# Patient Record
(demographics unavailable — no encounter records)

---

## 2024-10-18 NOTE — HISTORY OF PRESENT ILLNESS
[FreeTextEntry1] : 55 yo deaf M presents with history of balanitis since July Had similar issues about 7 yrs ago and attributed it to a sensitivity to his condom Has also been having LUTS - weak flow, incomplete bladder emptying Also with nocturia which seems to be exacerbated by ejaculation Voiding just a few times per day, nocturia 2-3/night no dysuria, no gross hematuria Drinks 1L of water, sometimes coffee Recent labwork done by PCP also shows elevated PSA  9/15/21 Interval history: Doing well since last visit When he takes tamsulosin, LUTS is well controlled Notices that when he forgets the tamsulosin, huge difference in LUTS Balanitis resolved with previously prescribed steroid cream Recent PSA 5.52 Prostate MRi done after last visit showed no suspicious lesions but showed a very enlarged prostate (almost 100gms)  3/16/22 Interval history: LUTS under good control Some nocturia - 1-2/night No recurrence of his balanitis Here today for repeat PSA  9/16/22 Interval history: hasn't been taking tamsulosin consistently Intermittent LUTS No other complaints  3/17/23 Interval history: had some penile irritation when he got some hand  on it stable LUTS deferred rectal exam today  10/6/23 Interval history: Hasn't been taking tamsulosin States that he was confused because PCP sent him his refills instead of our office Noticed some weaker flow without tamsulosin  4/12/24 Interval history: some post void dribbling Stable LUTS on tamsulosin  10/18/24 Interval history: Stable LUTS on tamsulosin

## 2024-10-18 NOTE — HISTORY OF PRESENT ILLNESS
[FreeTextEntry1] : 57 yo deaf M presents with history of balanitis since July Had similar issues about 7 yrs ago and attributed it to a sensitivity to his condom Has also been having LUTS - weak flow, incomplete bladder emptying Also with nocturia which seems to be exacerbated by ejaculation Voiding just a few times per day, nocturia 2-3/night no dysuria, no gross hematuria Drinks 1L of water, sometimes coffee Recent labwork done by PCP also shows elevated PSA  9/15/21 Interval history: Doing well since last visit When he takes tamsulosin, LUTS is well controlled Notices that when he forgets the tamsulosin, huge difference in LUTS Balanitis resolved with previously prescribed steroid cream Recent PSA 5.52 Prostate MRi done after last visit showed no suspicious lesions but showed a very enlarged prostate (almost 100gms)  3/16/22 Interval history: LUTS under good control Some nocturia - 1-2/night No recurrence of his balanitis Here today for repeat PSA  9/16/22 Interval history: hasn't been taking tamsulosin consistently Intermittent LUTS No other complaints  3/17/23 Interval history: had some penile irritation when he got some hand  on it stable LUTS deferred rectal exam today  10/6/23 Interval history: Hasn't been taking tamsulosin States that he was confused because PCP sent him his refills instead of our office Noticed some weaker flow without tamsulosin  4/12/24 Interval history: some post void dribbling Stable LUTS on tamsulosin  10/18/24 Interval history: Stable LUTS on tamsulosin

## 2025-02-04 NOTE — DISCUSSION/SUMMARY
[de-identified] : Alec Hernandez is a 61-year-old male who presents to the office for evaluation of his bilateral knee pain.  X-rays showed bilateral knee osteoarthritis.  Examination showed good bilateral knee range of motion. Discussed with the patient the examination and imaging findings.  Discussed with the patient the management of patient's knee osteoarthritis at this time, including physical therapy, anti-inflammatories, and injections.  Patient was given referral for physical therapy.  Patient will take Tylenol as needed for pain control.  Patient will follow-up in 2 months for reevaluation and management.  Patient understanding and in agreement the plan.  All questions answered   Plan: -Physical Therapy -Tylenol as needed for pain control -Follow up in 2 months for reevaluation and management

## 2025-02-04 NOTE — PHYSICAL EXAM
[de-identified] : Constitutional:  61 year old male, alert and oriented, cooperative, in no acute distress.  HEENT  NC/AT.  Appearance: symmetric  Chest/Respiratory  Respiratory effort: no intercostal retractions or use of accessory muscles. Nonlabored Breathing  Mental Status:  Judgment, insight: intact Orientation: oriented to time, place, and person  Left Knee  Inspection:     Skin intact, no rashes or lesions     No Effusion     Non-tender to palpation over tibial tubercle, patella, medial and lateral joint line, and pes insertion.  Range of Motion: 	Extension - 0 degrees 	Flexion - 120 degrees 	Extensor lag: None  Stability:      Demonstrates no Varus or Valgus instability      Negative Anterior or Posterior drawer.      Negative Lachman's  Patella: stable, tracks well.   Right Knee  Inspection:     Skin intact, no rashes or lesions     No Effusion     Non-tender to palpation over tibial tubercle, patella, lateral joint line, and pes insertion.      Tenderness over the medial joint line  Range of Motion: 	Extension - 0 degrees 	Flexion - 120 degrees 	Extensor lag: None  Stability:      Demonstrates no Varus or Valgus instability      Negative Anterior or Posterior drawer.      Negative Lachman's  Patella: stable, tracks well.   Neurologic Exam     Motor intact including 5/5 Extensor Hallucis Longus, 5/5 Flexor Hallucis Longus, 5/5 Tibialis Anterior and 5/5 Gastrocnemius     Sensation Intact to Light Touch including Saphenous, Sural, Superficial Peroneal, Deep Peroneal, Tibial nerve distributions  Vascular Exam     Foot is warm and well perfused with 2+ Dorsalis Pedis Pulse   No pain with range of motion of the bilateral hips. No lumbar paraspinal muscle tenderness. [de-identified] : XRay: XRays of the Right Knee (4 Views) taken in the office today and reviewed with the patient. XRays demonstrate joint space narrowing in the medial compartment, consistent with osteoarthritis, KL rdGrdrrdarddrderd:rd rd3rd. There is varus alignment. (my personal interpretation)  XRay: XRays of the Left  Knee (4 Views) taken in the office today and reviewed with the patient. XRays demonstrate joint space narrowing in the medial compartment, consistent with osteoarthritis, KL rdGrdrrdarddrderd:rd rd3rd. There is varus alignment. (my personal interpretation)

## 2025-02-04 NOTE — DISCUSSION/SUMMARY
[de-identified] : Alec Hernandez is a 61-year-old male who presents to the office for evaluation of his bilateral knee pain.  X-rays showed bilateral knee osteoarthritis.  Examination showed good bilateral knee range of motion. Discussed with the patient the examination and imaging findings.  Discussed with the patient the management of patient's knee osteoarthritis at this time, including physical therapy, anti-inflammatories, and injections.  Patient was given referral for physical therapy.  Patient will take Tylenol as needed for pain control.  Patient will follow-up in 2 months for reevaluation and management.  Patient understanding and in agreement the plan.  All questions answered   Plan: -Physical Therapy -Tylenol as needed for pain control -Follow up in 2 months for reevaluation and management

## 2025-02-04 NOTE — HISTORY OF PRESENT ILLNESS
[de-identified] : Translated by   Alec Hernandez is a 61-year-old male who presents to the office for evaluation of his bilateral knee pain.  Patient has been experiencing bilateral (right greater than left) knee pain for about 2 to 3 years.  It is worse with going from a sitting to a standing position and after playing soccer.  He has pain with walking.  Pain is located over the medial knee.  He has tried topical ointments and exercises.  Patient has tried meloxicam and Tylenol.  He has not tried physical therapy or injections.  History: Diabetes, HTN, HLD

## 2025-02-04 NOTE — PHYSICAL EXAM
[de-identified] : Constitutional:  61 year old male, alert and oriented, cooperative, in no acute distress.  HEENT  NC/AT.  Appearance: symmetric  Chest/Respiratory  Respiratory effort: no intercostal retractions or use of accessory muscles. Nonlabored Breathing  Mental Status:  Judgment, insight: intact Orientation: oriented to time, place, and person  Left Knee  Inspection:     Skin intact, no rashes or lesions     No Effusion     Non-tender to palpation over tibial tubercle, patella, medial and lateral joint line, and pes insertion.  Range of Motion: 	Extension - 0 degrees 	Flexion - 120 degrees 	Extensor lag: None  Stability:      Demonstrates no Varus or Valgus instability      Negative Anterior or Posterior drawer.      Negative Lachman's  Patella: stable, tracks well.   Right Knee  Inspection:     Skin intact, no rashes or lesions     No Effusion     Non-tender to palpation over tibial tubercle, patella, lateral joint line, and pes insertion.      Tenderness over the medial joint line  Range of Motion: 	Extension - 0 degrees 	Flexion - 120 degrees 	Extensor lag: None  Stability:      Demonstrates no Varus or Valgus instability      Negative Anterior or Posterior drawer.      Negative Lachman's  Patella: stable, tracks well.   Neurologic Exam     Motor intact including 5/5 Extensor Hallucis Longus, 5/5 Flexor Hallucis Longus, 5/5 Tibialis Anterior and 5/5 Gastrocnemius     Sensation Intact to Light Touch including Saphenous, Sural, Superficial Peroneal, Deep Peroneal, Tibial nerve distributions  Vascular Exam     Foot is warm and well perfused with 2+ Dorsalis Pedis Pulse   No pain with range of motion of the bilateral hips. No lumbar paraspinal muscle tenderness. [de-identified] : XRay: XRays of the Right Knee (4 Views) taken in the office today and reviewed with the patient. XRays demonstrate joint space narrowing in the medial compartment, consistent with osteoarthritis, KL rdGrdrrdarddrderd:rd rd3rd. There is varus alignment. (my personal interpretation)  XRay: XRays of the Left  Knee (4 Views) taken in the office today and reviewed with the patient. XRays demonstrate joint space narrowing in the medial compartment, consistent with osteoarthritis, KL rdGrdrrdarddrderd:rd rd3rd. There is varus alignment. (my personal interpretation)

## 2025-02-04 NOTE — HISTORY OF PRESENT ILLNESS
[de-identified] : Translated by   Alec Hernandez is a 61-year-old male who presents to the office for evaluation of his bilateral knee pain.  Patient has been experiencing bilateral (right greater than left) knee pain for about 2 to 3 years.  It is worse with going from a sitting to a standing position and after playing soccer.  He has pain with walking.  Pain is located over the medial knee.  He has tried topical ointments and exercises.  Patient has tried meloxicam and Tylenol.  He has not tried physical therapy or injections.  History: Diabetes, HTN, HLD

## 2025-02-19 NOTE — PHYSICAL EXAM
[de-identified] :  Lumbar Physical Exam   Gait - Normal   Station - Normal   Sagittal balance - Normal   Compensatory mechanism? - None   Reflexes Patellar - normal Gastroc - normal Clonus - No   Hip Exam - Normal   Straight leg raise - none   Pulses - 2+ dp/pt   Range of motion - normal   Sensation Sensation intact to light touch in L1, L2, L3, L4, L5 and S1 dermatomes bilaterally   Motor IP Quad HS TA Gastroc EHL Right 3+/5 5/5 5/5 5/5 5/5 5/5 Left 5/5 5/5 5/5 5/5 5/5 5/5 [de-identified] : lumbar rads facet arthropathy disc degeneration severe right hip OA

## 2025-02-19 NOTE — ASSESSMENT
[FreeTextEntry1] : I had a lengthy discussion with the patient in regards to their treatment plan and diagnosis.  They do have objective weakness findings on my exam.  Their symptoms have persisted despite the conservative management they have attempted thus far.  As a result I would like to proceed with a lumbar MRI.  In tandem with this they should begin a physical therapy/home therapy program.  The patient can take Tylenol/NSAIDs as needed for pain control if medically able to.  I will have the patient follow-up in 3 to 4 weeks for repeat clinical evaluation.  I encouraged them to follow-up sooner if their symptoms worsen or change in any way.   The patient has tried the following treatments: Activity modification                + Ice/Compression                     + Braces                                     - NSAIDS                                  + Physical Therapy                    +  Please note the above modalities have been tried for 6+ weeks over the past 2 months

## 2025-02-19 NOTE — HISTORY OF PRESENT ILLNESS
[de-identified] : Patient is a 61 year old male who presents today for an initial evaluation of back pain and knee pain. He presents with an . He states that he has had back for many years. Patient reports that he visited a chiropractor which provided relief. He states that he has had an acute exacerbation of his back and knee pain for the past month. Patient has attended physical therapy. He has followed up with Dr. Hernandez. He now has right sided groin pain.

## 2025-02-27 NOTE — PHYSICAL EXAM
[de-identified] : Constitutional:  61 year old male, alert and oriented, cooperative, in no acute distress.  HEENT  NC/AT.  Appearance: symmetric  Chest/Respiratory  Respiratory effort: no intercostal retractions or use of accessory muscles. Nonlabored Breathing  Mental Status:  Judgment, insight: intact Orientation: oriented to time, place, and person  Left Knee  Inspection:     No Effusion     Non-tender to palpation over tibial tubercle, patella, medial and lateral joint line, and pes insertion.  Range of Motion: 	Extension - 0 degrees 	Flexion - 120 degrees 	Extensor lag: None  Stability:      Demonstrates no Varus or Valgus instability      Negative Anterior or Posterior drawer.      Negative Lachman's  Patella: stable, tracks well.   Right Knee  Inspection:     No Effusion     Non-tender to palpation over tibial tubercle, patella, lateral joint line, and pes insertion.       Range of Motion: 	Extension - 0 degrees 	Flexion - 120 degrees 	Extensor lag: None  Stability:      Demonstrates no Varus or Valgus instability      Negative Anterior or Posterior drawer.      Negative Lachman's  Patella: stable, tracks well.   Neurologic Exam     Motor intact including 5/5 Extensor Hallucis Longus, 5/5 Flexor Hallucis Longus, 5/5 Tibialis Anterior and 5/5 Gastrocnemius     Sensation Intact to Light Touch including Saphenous, Sural, Superficial Peroneal, Deep Peroneal, Tibial nerve distributions  Vascular Exam     Foot is warm and well perfused with 2+ Dorsalis Pedis Pulse   Right Hip Exam:  Tenderness:  	Sacroiliac: Negative  	Greater trochanter: Negative                   ANDREW Test: Positive                 FADIR Test: Positive  Range of Motion:                 Extension - 0  	Flexion - 95 	IR - 20 	ER - 30  	Abd - 30  	Add -  30  [de-identified] : XRay: XRays of the Right Knee (4 Views) taken on 2/6/2025. XRays demonstrate joint space narrowing in the medial compartment, consistent with osteoarthritis, KL rdGrdrrdarddrderd:rd rd3rd. There is varus alignment. (my personal interpretation)  XRay: XRays of the Left  Knee (4 Views) taken on 2/6/2025. XRays demonstrate joint space narrowing in the medial compartment, consistent with osteoarthritis, KL rdGrdrrdarddrderd:rd rd3rd. There is varus alignment. (my personal interpretation)   XRay: XRays of the Pelvis (1 View) and Right Hip (2 Views) taken in the office today and discussed with the patient. XRays demonstrate moderate joint space narrowing in the hip joint with subchondral sclerosis, overlying osteophytes, all consistent with osteoarthritis, Tonnis Grade: 2. (my personal interpretation)

## 2025-02-27 NOTE — PHYSICAL EXAM
[de-identified] : Constitutional:  61 year old male, alert and oriented, cooperative, in no acute distress.  HEENT  NC/AT.  Appearance: symmetric  Chest/Respiratory  Respiratory effort: no intercostal retractions or use of accessory muscles. Nonlabored Breathing  Mental Status:  Judgment, insight: intact Orientation: oriented to time, place, and person  Left Knee  Inspection:     No Effusion     Non-tender to palpation over tibial tubercle, patella, medial and lateral joint line, and pes insertion.  Range of Motion: 	Extension - 0 degrees 	Flexion - 120 degrees 	Extensor lag: None  Stability:      Demonstrates no Varus or Valgus instability      Negative Anterior or Posterior drawer.      Negative Lachman's  Patella: stable, tracks well.   Right Knee  Inspection:     No Effusion     Non-tender to palpation over tibial tubercle, patella, lateral joint line, and pes insertion.       Range of Motion: 	Extension - 0 degrees 	Flexion - 120 degrees 	Extensor lag: None  Stability:      Demonstrates no Varus or Valgus instability      Negative Anterior or Posterior drawer.      Negative Lachman's  Patella: stable, tracks well.   Neurologic Exam     Motor intact including 5/5 Extensor Hallucis Longus, 5/5 Flexor Hallucis Longus, 5/5 Tibialis Anterior and 5/5 Gastrocnemius     Sensation Intact to Light Touch including Saphenous, Sural, Superficial Peroneal, Deep Peroneal, Tibial nerve distributions  Vascular Exam     Foot is warm and well perfused with 2+ Dorsalis Pedis Pulse   Right Hip Exam:  Tenderness:  	Sacroiliac: Negative  	Greater trochanter: Negative                   ANDREW Test: Positive                 FADIR Test: Positive  Range of Motion:                 Extension - 0  	Flexion - 95 	IR - 20 	ER - 30  	Abd - 30  	Add -  30  [de-identified] : XRay: XRays of the Right Knee (4 Views) taken on 2/6/2025. XRays demonstrate joint space narrowing in the medial compartment, consistent with osteoarthritis, KL rdGrdrrdarddrderd:rd rd3rd. There is varus alignment. (my personal interpretation)  XRay: XRays of the Left  Knee (4 Views) taken on 2/6/2025. XRays demonstrate joint space narrowing in the medial compartment, consistent with osteoarthritis, KL rdGrdrrdarddrderd:rd rd3rd. There is varus alignment. (my personal interpretation)   XRay: XRays of the Pelvis (1 View) and Right Hip (2 Views) taken in the office today and discussed with the patient. XRays demonstrate moderate joint space narrowing in the hip joint with subchondral sclerosis, overlying osteophytes, all consistent with osteoarthritis, Tonnis Grade: 2. (my personal interpretation)

## 2025-02-27 NOTE — HISTORY OF PRESENT ILLNESS
[de-identified] : 2/27/2025 Translated by   Alec Hernandez presents to the office for evaluation of his right hip pain.  Patient has been in physical therapy and is experiencing right hip pain.  He also has been doing karate.  2/4/2025 Translated by   Alec Hernandez is a 61-year-old male who presents to the office for evaluation of his bilateral knee pain.  Patient has been experiencing bilateral (right greater than left) knee pain for about 2 to 3 years.  It is worse with going from a sitting to a standing position and after playing soccer.  He has pain with walking.  Pain is located over the medial knee.  He has tried topical ointments and exercises.  Patient has tried meloxicam and Tylenol.  He has not tried physical therapy or injections.  History: Diabetes, HTN, HLD

## 2025-02-27 NOTE — DISCUSSION/SUMMARY
[de-identified] : Alec Hernandez is a 61-year-old male who presents to the office for evaluation of his bilateral knee pain and right hip pain. Prior X-rays showed bilateral knee osteoarthritis. XRays showed right hip osteoarthritis. Examination showed good bilateral knee range of motion and pain with hip range of motion. Discussed with the patient the examination and imaging findings.  Discussed with the patient the management of patient's knee and hip osteoarthritis at this time, including physical therapy, anti-inflammatories, and injections.  Patient was given referral for physical therapy.  Patient will take Tylenol as needed for pain control.  Patient will follow-up in 2 months for reevaluation and management.  Patient understanding and in agreement the plan.  All questions answered   Plan: -Physical Therapy -Tylenol as needed for pain control -Follow up in 2 months for reevaluation and management

## 2025-02-27 NOTE — HISTORY OF PRESENT ILLNESS
[de-identified] : 2/27/2025 Translated by   Alec Hernandez presents to the office for evaluation of his right hip pain.  Patient has been in physical therapy and is experiencing right hip pain.  He also has been doing karate.  2/4/2025 Translated by   Alec Hernandez is a 61-year-old male who presents to the office for evaluation of his bilateral knee pain.  Patient has been experiencing bilateral (right greater than left) knee pain for about 2 to 3 years.  It is worse with going from a sitting to a standing position and after playing soccer.  He has pain with walking.  Pain is located over the medial knee.  He has tried topical ointments and exercises.  Patient has tried meloxicam and Tylenol.  He has not tried physical therapy or injections.  History: Diabetes, HTN, HLD

## 2025-02-27 NOTE — DISCUSSION/SUMMARY
[de-identified] : Alec Hernandez is a 61-year-old male who presents to the office for evaluation of his bilateral knee pain and right hip pain. Prior X-rays showed bilateral knee osteoarthritis. XRays showed right hip osteoarthritis. Examination showed good bilateral knee range of motion and pain with hip range of motion. Discussed with the patient the examination and imaging findings.  Discussed with the patient the management of patient's knee and hip osteoarthritis at this time, including physical therapy, anti-inflammatories, and injections.  Patient was given referral for physical therapy.  Patient will take Tylenol as needed for pain control.  Patient will follow-up in 2 months for reevaluation and management.  Patient understanding and in agreement the plan.  All questions answered   Plan: -Physical Therapy -Tylenol as needed for pain control -Follow up in 2 months for reevaluation and management

## 2025-04-18 NOTE — HISTORY OF PRESENT ILLNESS
[FreeTextEntry1] : 55 yo deaf M presents with history of balanitis since July Had similar issues about 7 yrs ago and attributed it to a sensitivity to his condom Has also been having LUTS - weak flow, incomplete bladder emptying Also with nocturia which seems to be exacerbated by ejaculation Voiding just a few times per day, nocturia 2-3/night no dysuria, no gross hematuria Drinks 1L of water, sometimes coffee Recent labwork done by PCP also shows elevated PSA  9/15/21 Interval history: Doing well since last visit When he takes tamsulosin, LUTS is well controlled Notices that when he forgets the tamsulosin, huge difference in LUTS Balanitis resolved with previously prescribed steroid cream Recent PSA 5.52 Prostate MRi done after last visit showed no suspicious lesions but showed a very enlarged prostate (almost 100gms)  3/16/22 Interval history: LUTS under good control Some nocturia - 1-2/night No recurrence of his balanitis Here today for repeat PSA  9/16/22 Interval history: hasn't been taking tamsulosin consistently Intermittent LUTS No other complaints  3/17/23 Interval history: had some penile irritation when he got some hand  on it stable LUTS deferred rectal exam today  10/6/23 Interval history: Hasn't been taking tamsulosin States that he was confused because PCP sent him his refills instead of our office Noticed some weaker flow without tamsulosin  4/12/24 Interval history: some post void dribbling Stable LUTS on tamsulosin  10/18/24 Interval history: Stable LUTS on tamsulosin No complaints today  4/18/25 Interval history: Noticed urinary urgency after sex